# Patient Record
Sex: MALE | URBAN - METROPOLITAN AREA
[De-identification: names, ages, dates, MRNs, and addresses within clinical notes are randomized per-mention and may not be internally consistent; named-entity substitution may affect disease eponyms.]

---

## 2021-06-30 ENCOUNTER — OFFICE VISIT (OUTPATIENT)
Dept: URBAN - METROPOLITAN AREA CLINIC 48 | Facility: CLINIC | Age: 86
End: 2021-06-30
Payer: COMMERCIAL

## 2021-06-30 DIAGNOSIS — H50.10 EXOTROPIA: ICD-10-CM

## 2021-06-30 DIAGNOSIS — E11.9 TYPE 2 DIABETES MELLITUS W/O COMPLICATION: Primary | ICD-10-CM

## 2021-06-30 DIAGNOSIS — H52.7 UNSPECIFIED DISORDER OF REFRACTION: ICD-10-CM

## 2021-06-30 DIAGNOSIS — H02.839 DERMATOCHALASIS OF EYELID: ICD-10-CM

## 2021-06-30 PROCEDURE — 99204 OFFICE O/P NEW MOD 45 MIN: CPT | Performed by: OPTOMETRIST

## 2021-06-30 ASSESSMENT — INTRAOCULAR PRESSURE
OD: 12
OS: 10

## 2021-06-30 NOTE — IMPRESSION/PLAN
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 OD>OS Plan: Disc with patient He would like to explore options of blepharoplasty RTO 3-4 weeks for lid eval with Dr Kami Samuels, will also do ARx and OCT-Mac at that time to confirm that porr vision is from refractive error

## 2021-06-30 NOTE — IMPRESSION/PLAN
Impression: Type 2 diabetes mellitus w/o complication: O47.7. Plan: No diabetic retinopathy present on exam. No treatment is needed. Discussed with patient the importance of glucose control and ocular risk to prevent the progression to Retinopathy.  

Follow up annually with dilated fundus exam.

Patient wants to explore blepharoplasty (see note 20), will also check vision at that time